# Patient Record
Sex: MALE | Race: WHITE | NOT HISPANIC OR LATINO | URBAN - METROPOLITAN AREA
[De-identification: names, ages, dates, MRNs, and addresses within clinical notes are randomized per-mention and may not be internally consistent; named-entity substitution may affect disease eponyms.]

---

## 2023-07-11 PROBLEM — Z00.00 ENCOUNTER FOR PREVENTIVE HEALTH EXAMINATION: Status: ACTIVE | Noted: 2023-07-11

## 2023-08-02 RX ORDER — EZETIMIBE 10 MG/1
10 TABLET ORAL DAILY
Qty: 90 | Refills: 3 | Status: ACTIVE | COMMUNITY
Start: 2023-08-02 | End: 1900-01-01

## 2023-12-19 ENCOUNTER — EMERGENCY (EMERGENCY)
Facility: HOSPITAL | Age: 53
LOS: 1 days | Discharge: HOME | End: 2023-12-19
Attending: EMERGENCY MEDICINE | Admitting: EMERGENCY MEDICINE
Payer: COMMERCIAL

## 2023-12-19 VITALS
TEMPERATURE: 98 F | RESPIRATION RATE: 16 BRPM | HEIGHT: 73 IN | WEIGHT: 191.8 LBS | OXYGEN SATURATION: 97 % | DIASTOLIC BLOOD PRESSURE: 91 MMHG | SYSTOLIC BLOOD PRESSURE: 175 MMHG | HEART RATE: 72 BPM

## 2023-12-19 VITALS
RESPIRATION RATE: 17 BRPM | OXYGEN SATURATION: 96 % | HEART RATE: 74 BPM | SYSTOLIC BLOOD PRESSURE: 133 MMHG | DIASTOLIC BLOOD PRESSURE: 86 MMHG

## 2023-12-19 DIAGNOSIS — N17.9 ACUTE KIDNEY FAILURE, UNSPECIFIED: ICD-10-CM

## 2023-12-19 DIAGNOSIS — D72.829 ELEVATED WHITE BLOOD CELL COUNT, UNSPECIFIED: ICD-10-CM

## 2023-12-19 DIAGNOSIS — Z88.0 ALLERGY STATUS TO PENICILLIN: ICD-10-CM

## 2023-12-19 DIAGNOSIS — N13.2 HYDRONEPHROSIS WITH RENAL AND URETERAL CALCULOUS OBSTRUCTION: ICD-10-CM

## 2023-12-19 DIAGNOSIS — R10.31 RIGHT LOWER QUADRANT PAIN: ICD-10-CM

## 2023-12-19 DIAGNOSIS — I25.10 ATHEROSCLEROTIC HEART DISEASE OF NATIVE CORONARY ARTERY WITHOUT ANGINA PECTORIS: ICD-10-CM

## 2023-12-19 LAB
ALBUMIN SERPL ELPH-MCNC: 4.1 G/DL — SIGNIFICANT CHANGE UP (ref 3.3–5)
ALBUMIN SERPL ELPH-MCNC: 4.1 G/DL — SIGNIFICANT CHANGE UP (ref 3.3–5)
ALP SERPL-CCNC: 51 U/L — SIGNIFICANT CHANGE UP (ref 40–120)
ALP SERPL-CCNC: 51 U/L — SIGNIFICANT CHANGE UP (ref 40–120)
ALT FLD-CCNC: 39 U/L — SIGNIFICANT CHANGE UP (ref 10–45)
ALT FLD-CCNC: 39 U/L — SIGNIFICANT CHANGE UP (ref 10–45)
ANION GAP SERPL CALC-SCNC: 11 MMOL/L — SIGNIFICANT CHANGE UP (ref 5–17)
ANION GAP SERPL CALC-SCNC: 11 MMOL/L — SIGNIFICANT CHANGE UP (ref 5–17)
APPEARANCE UR: CLEAR — SIGNIFICANT CHANGE UP
APPEARANCE UR: CLEAR — SIGNIFICANT CHANGE UP
AST SERPL-CCNC: 28 U/L — SIGNIFICANT CHANGE UP (ref 10–40)
AST SERPL-CCNC: 28 U/L — SIGNIFICANT CHANGE UP (ref 10–40)
BASOPHILS # BLD AUTO: 0.02 K/UL — SIGNIFICANT CHANGE UP (ref 0–0.2)
BASOPHILS # BLD AUTO: 0.02 K/UL — SIGNIFICANT CHANGE UP (ref 0–0.2)
BASOPHILS NFR BLD AUTO: 0.2 % — SIGNIFICANT CHANGE UP (ref 0–2)
BASOPHILS NFR BLD AUTO: 0.2 % — SIGNIFICANT CHANGE UP (ref 0–2)
BILIRUB SERPL-MCNC: 0.8 MG/DL — SIGNIFICANT CHANGE UP (ref 0.2–1.2)
BILIRUB SERPL-MCNC: 0.8 MG/DL — SIGNIFICANT CHANGE UP (ref 0.2–1.2)
BILIRUB UR-MCNC: NEGATIVE — SIGNIFICANT CHANGE UP
BILIRUB UR-MCNC: NEGATIVE — SIGNIFICANT CHANGE UP
BLD GP AB SCN SERPL QL: NEGATIVE — SIGNIFICANT CHANGE UP
BLD GP AB SCN SERPL QL: NEGATIVE — SIGNIFICANT CHANGE UP
BUN SERPL-MCNC: 26 MG/DL — HIGH (ref 7–23)
BUN SERPL-MCNC: 26 MG/DL — HIGH (ref 7–23)
CALCIUM SERPL-MCNC: 9.4 MG/DL — SIGNIFICANT CHANGE UP (ref 8.4–10.5)
CALCIUM SERPL-MCNC: 9.4 MG/DL — SIGNIFICANT CHANGE UP (ref 8.4–10.5)
CHLORIDE SERPL-SCNC: 103 MMOL/L — SIGNIFICANT CHANGE UP (ref 96–108)
CHLORIDE SERPL-SCNC: 103 MMOL/L — SIGNIFICANT CHANGE UP (ref 96–108)
CO2 SERPL-SCNC: 24 MMOL/L — SIGNIFICANT CHANGE UP (ref 22–31)
CO2 SERPL-SCNC: 24 MMOL/L — SIGNIFICANT CHANGE UP (ref 22–31)
COLOR SPEC: YELLOW — SIGNIFICANT CHANGE UP
COLOR SPEC: YELLOW — SIGNIFICANT CHANGE UP
CREAT SERPL-MCNC: 1.29 MG/DL — SIGNIFICANT CHANGE UP (ref 0.5–1.3)
CREAT SERPL-MCNC: 1.29 MG/DL — SIGNIFICANT CHANGE UP (ref 0.5–1.3)
DIFF PNL FLD: NEGATIVE — SIGNIFICANT CHANGE UP
DIFF PNL FLD: NEGATIVE — SIGNIFICANT CHANGE UP
EGFR: 66 ML/MIN/1.73M2 — SIGNIFICANT CHANGE UP
EGFR: 66 ML/MIN/1.73M2 — SIGNIFICANT CHANGE UP
EOSINOPHIL # BLD AUTO: 0.04 K/UL — SIGNIFICANT CHANGE UP (ref 0–0.5)
EOSINOPHIL # BLD AUTO: 0.04 K/UL — SIGNIFICANT CHANGE UP (ref 0–0.5)
EOSINOPHIL NFR BLD AUTO: 0.3 % — SIGNIFICANT CHANGE UP (ref 0–6)
EOSINOPHIL NFR BLD AUTO: 0.3 % — SIGNIFICANT CHANGE UP (ref 0–6)
GLUCOSE SERPL-MCNC: 119 MG/DL — HIGH (ref 70–99)
GLUCOSE SERPL-MCNC: 119 MG/DL — HIGH (ref 70–99)
GLUCOSE UR QL: NEGATIVE MG/DL — SIGNIFICANT CHANGE UP
GLUCOSE UR QL: NEGATIVE MG/DL — SIGNIFICANT CHANGE UP
HCT VFR BLD CALC: 40.5 % — SIGNIFICANT CHANGE UP (ref 39–50)
HCT VFR BLD CALC: 40.5 % — SIGNIFICANT CHANGE UP (ref 39–50)
HGB BLD-MCNC: 14.2 G/DL — SIGNIFICANT CHANGE UP (ref 13–17)
HGB BLD-MCNC: 14.2 G/DL — SIGNIFICANT CHANGE UP (ref 13–17)
IMM GRANULOCYTES NFR BLD AUTO: 0.3 % — SIGNIFICANT CHANGE UP (ref 0–0.9)
IMM GRANULOCYTES NFR BLD AUTO: 0.3 % — SIGNIFICANT CHANGE UP (ref 0–0.9)
KETONES UR-MCNC: ABNORMAL MG/DL
KETONES UR-MCNC: ABNORMAL MG/DL
LACTATE SERPL-SCNC: 0.9 MMOL/L — SIGNIFICANT CHANGE UP (ref 0.5–2)
LACTATE SERPL-SCNC: 0.9 MMOL/L — SIGNIFICANT CHANGE UP (ref 0.5–2)
LEUKOCYTE ESTERASE UR-ACNC: NEGATIVE — SIGNIFICANT CHANGE UP
LEUKOCYTE ESTERASE UR-ACNC: NEGATIVE — SIGNIFICANT CHANGE UP
LIDOCAIN IGE QN: 24 U/L — SIGNIFICANT CHANGE UP (ref 7–60)
LIDOCAIN IGE QN: 24 U/L — SIGNIFICANT CHANGE UP (ref 7–60)
LYMPHOCYTES # BLD AUTO: 1.26 K/UL — SIGNIFICANT CHANGE UP (ref 1–3.3)
LYMPHOCYTES # BLD AUTO: 1.26 K/UL — SIGNIFICANT CHANGE UP (ref 1–3.3)
LYMPHOCYTES # BLD AUTO: 10.3 % — LOW (ref 13–44)
LYMPHOCYTES # BLD AUTO: 10.3 % — LOW (ref 13–44)
MCHC RBC-ENTMCNC: 30.2 PG — SIGNIFICANT CHANGE UP (ref 27–34)
MCHC RBC-ENTMCNC: 30.2 PG — SIGNIFICANT CHANGE UP (ref 27–34)
MCHC RBC-ENTMCNC: 35.1 GM/DL — SIGNIFICANT CHANGE UP (ref 32–36)
MCHC RBC-ENTMCNC: 35.1 GM/DL — SIGNIFICANT CHANGE UP (ref 32–36)
MCV RBC AUTO: 86.2 FL — SIGNIFICANT CHANGE UP (ref 80–100)
MCV RBC AUTO: 86.2 FL — SIGNIFICANT CHANGE UP (ref 80–100)
MONOCYTES # BLD AUTO: 0.88 K/UL — SIGNIFICANT CHANGE UP (ref 0–0.9)
MONOCYTES # BLD AUTO: 0.88 K/UL — SIGNIFICANT CHANGE UP (ref 0–0.9)
MONOCYTES NFR BLD AUTO: 7.2 % — SIGNIFICANT CHANGE UP (ref 2–14)
MONOCYTES NFR BLD AUTO: 7.2 % — SIGNIFICANT CHANGE UP (ref 2–14)
NEUTROPHILS # BLD AUTO: 10.03 K/UL — HIGH (ref 1.8–7.4)
NEUTROPHILS # BLD AUTO: 10.03 K/UL — HIGH (ref 1.8–7.4)
NEUTROPHILS NFR BLD AUTO: 81.7 % — HIGH (ref 43–77)
NEUTROPHILS NFR BLD AUTO: 81.7 % — HIGH (ref 43–77)
NITRITE UR-MCNC: NEGATIVE — SIGNIFICANT CHANGE UP
NITRITE UR-MCNC: NEGATIVE — SIGNIFICANT CHANGE UP
NRBC # BLD: 0 /100 WBCS — SIGNIFICANT CHANGE UP (ref 0–0)
NRBC # BLD: 0 /100 WBCS — SIGNIFICANT CHANGE UP (ref 0–0)
PH UR: 6 — SIGNIFICANT CHANGE UP (ref 5–8)
PH UR: 6 — SIGNIFICANT CHANGE UP (ref 5–8)
PLATELET # BLD AUTO: 208 K/UL — SIGNIFICANT CHANGE UP (ref 150–400)
PLATELET # BLD AUTO: 208 K/UL — SIGNIFICANT CHANGE UP (ref 150–400)
POTASSIUM SERPL-MCNC: 4.5 MMOL/L — SIGNIFICANT CHANGE UP (ref 3.5–5.3)
POTASSIUM SERPL-MCNC: 4.5 MMOL/L — SIGNIFICANT CHANGE UP (ref 3.5–5.3)
POTASSIUM SERPL-SCNC: 4.5 MMOL/L — SIGNIFICANT CHANGE UP (ref 3.5–5.3)
POTASSIUM SERPL-SCNC: 4.5 MMOL/L — SIGNIFICANT CHANGE UP (ref 3.5–5.3)
PROT SERPL-MCNC: 6.6 G/DL — SIGNIFICANT CHANGE UP (ref 6–8.3)
PROT SERPL-MCNC: 6.6 G/DL — SIGNIFICANT CHANGE UP (ref 6–8.3)
PROT UR-MCNC: SIGNIFICANT CHANGE UP MG/DL
PROT UR-MCNC: SIGNIFICANT CHANGE UP MG/DL
RBC # BLD: 4.7 M/UL — SIGNIFICANT CHANGE UP (ref 4.2–5.8)
RBC # BLD: 4.7 M/UL — SIGNIFICANT CHANGE UP (ref 4.2–5.8)
RBC # FLD: 11.9 % — SIGNIFICANT CHANGE UP (ref 10.3–14.5)
RBC # FLD: 11.9 % — SIGNIFICANT CHANGE UP (ref 10.3–14.5)
RH IG SCN BLD-IMP: POSITIVE — SIGNIFICANT CHANGE UP
RH IG SCN BLD-IMP: POSITIVE — SIGNIFICANT CHANGE UP
SODIUM SERPL-SCNC: 138 MMOL/L — SIGNIFICANT CHANGE UP (ref 135–145)
SODIUM SERPL-SCNC: 138 MMOL/L — SIGNIFICANT CHANGE UP (ref 135–145)
SP GR SPEC: >1.03 — HIGH (ref 1–1.03)
SP GR SPEC: >1.03 — HIGH (ref 1–1.03)
UROBILINOGEN FLD QL: 0.2 MG/DL — SIGNIFICANT CHANGE UP (ref 0.2–1)
UROBILINOGEN FLD QL: 0.2 MG/DL — SIGNIFICANT CHANGE UP (ref 0.2–1)
WBC # BLD: 12.27 K/UL — HIGH (ref 3.8–10.5)
WBC # BLD: 12.27 K/UL — HIGH (ref 3.8–10.5)
WBC # FLD AUTO: 12.27 K/UL — HIGH (ref 3.8–10.5)
WBC # FLD AUTO: 12.27 K/UL — HIGH (ref 3.8–10.5)

## 2023-12-19 PROCEDURE — 81003 URINALYSIS AUTO W/O SCOPE: CPT

## 2023-12-19 PROCEDURE — 99285 EMERGENCY DEPT VISIT HI MDM: CPT | Mod: 25

## 2023-12-19 PROCEDURE — 85025 COMPLETE CBC W/AUTO DIFF WBC: CPT

## 2023-12-19 PROCEDURE — 86901 BLOOD TYPING SEROLOGIC RH(D): CPT

## 2023-12-19 PROCEDURE — 96375 TX/PRO/DX INJ NEW DRUG ADDON: CPT

## 2023-12-19 PROCEDURE — 74177 CT ABD & PELVIS W/CONTRAST: CPT | Mod: 26,MA

## 2023-12-19 PROCEDURE — 96376 TX/PRO/DX INJ SAME DRUG ADON: CPT

## 2023-12-19 PROCEDURE — 83605 ASSAY OF LACTIC ACID: CPT

## 2023-12-19 PROCEDURE — 36415 COLL VENOUS BLD VENIPUNCTURE: CPT

## 2023-12-19 PROCEDURE — 80053 COMPREHEN METABOLIC PANEL: CPT

## 2023-12-19 PROCEDURE — 74177 CT ABD & PELVIS W/CONTRAST: CPT | Mod: MA

## 2023-12-19 PROCEDURE — 86900 BLOOD TYPING SEROLOGIC ABO: CPT

## 2023-12-19 PROCEDURE — 99285 EMERGENCY DEPT VISIT HI MDM: CPT

## 2023-12-19 PROCEDURE — 96374 THER/PROPH/DIAG INJ IV PUSH: CPT | Mod: XU

## 2023-12-19 PROCEDURE — 86850 RBC ANTIBODY SCREEN: CPT

## 2023-12-19 PROCEDURE — 84484 ASSAY OF TROPONIN QUANT: CPT

## 2023-12-19 PROCEDURE — 93010 ELECTROCARDIOGRAM REPORT: CPT

## 2023-12-19 PROCEDURE — 93005 ELECTROCARDIOGRAM TRACING: CPT

## 2023-12-19 PROCEDURE — 83690 ASSAY OF LIPASE: CPT

## 2023-12-19 RX ORDER — FAMOTIDINE 10 MG/ML
20 INJECTION INTRAVENOUS ONCE
Refills: 0 | Status: COMPLETED | OUTPATIENT
Start: 2023-12-19 | End: 2023-12-19

## 2023-12-19 RX ORDER — SODIUM CHLORIDE 9 MG/ML
1000 INJECTION INTRAMUSCULAR; INTRAVENOUS; SUBCUTANEOUS ONCE
Refills: 0 | Status: COMPLETED | OUTPATIENT
Start: 2023-12-19 | End: 2023-12-19

## 2023-12-19 RX ORDER — KETOROLAC TROMETHAMINE 30 MG/ML
1 SYRINGE (ML) INJECTION
Qty: 12 | Refills: 0
Start: 2023-12-19 | End: 2023-12-21

## 2023-12-19 RX ORDER — MORPHINE SULFATE 50 MG/1
2 CAPSULE, EXTENDED RELEASE ORAL ONCE
Refills: 0 | Status: DISCONTINUED | OUTPATIENT
Start: 2023-12-19 | End: 2023-12-19

## 2023-12-19 RX ORDER — ONDANSETRON 8 MG/1
4 TABLET, FILM COATED ORAL ONCE
Refills: 0 | Status: COMPLETED | OUTPATIENT
Start: 2023-12-19 | End: 2023-12-19

## 2023-12-19 RX ORDER — OXYCODONE AND ACETAMINOPHEN 5; 325 MG/1; MG/1
1 TABLET ORAL
Qty: 10 | Refills: 0
Start: 2023-12-19 | End: 2023-12-21

## 2023-12-19 RX ORDER — DOCUSATE SODIUM 100 MG
1 CAPSULE ORAL
Qty: 14 | Refills: 0 | DISCHARGE
Start: 2023-12-19 | End: 2023-12-25

## 2023-12-19 RX ORDER — TAMSULOSIN HYDROCHLORIDE 0.4 MG/1
1 CAPSULE ORAL
Qty: 7 | Refills: 0
Start: 2023-12-19 | End: 2023-12-25

## 2023-12-19 RX ORDER — OXYCODONE AND ACETAMINOPHEN 5; 325 MG/1; MG/1
1 TABLET ORAL
Qty: 10 | Refills: 0 | DISCHARGE
Start: 2023-12-19 | End: 2023-12-21

## 2023-12-19 RX ORDER — DOCUSATE SODIUM 100 MG
1 CAPSULE ORAL
Qty: 14 | Refills: 0
Start: 2023-12-19 | End: 2023-12-25

## 2023-12-19 RX ORDER — KETOROLAC TROMETHAMINE 30 MG/ML
15 SYRINGE (ML) INJECTION ONCE
Refills: 0 | Status: DISCONTINUED | OUTPATIENT
Start: 2023-12-19 | End: 2023-12-19

## 2023-12-19 RX ORDER — ONDANSETRON 8 MG/1
1 TABLET, FILM COATED ORAL
Qty: 1 | Refills: 0
Start: 2023-12-19 | End: 2023-12-21

## 2023-12-19 RX ORDER — TAMSULOSIN HYDROCHLORIDE 0.4 MG/1
0.4 CAPSULE ORAL ONCE
Refills: 0 | Status: COMPLETED | OUTPATIENT
Start: 2023-12-19 | End: 2023-12-19

## 2023-12-19 RX ORDER — KETOROLAC TROMETHAMINE 30 MG/ML
1 SYRINGE (ML) INJECTION
Qty: 12 | Refills: 0 | DISCHARGE
Start: 2023-12-19 | End: 2023-12-21

## 2023-12-19 RX ORDER — CEFTRIAXONE 500 MG/1
1000 INJECTION, POWDER, FOR SOLUTION INTRAMUSCULAR; INTRAVENOUS ONCE
Refills: 0 | Status: COMPLETED | OUTPATIENT
Start: 2023-12-19 | End: 2023-12-19

## 2023-12-19 RX ADMIN — SODIUM CHLORIDE 1000 MILLILITER(S): 9 INJECTION INTRAMUSCULAR; INTRAVENOUS; SUBCUTANEOUS at 05:11

## 2023-12-19 RX ADMIN — TAMSULOSIN HYDROCHLORIDE 0.4 MILLIGRAM(S): 0.4 CAPSULE ORAL at 07:47

## 2023-12-19 RX ADMIN — FAMOTIDINE 20 MILLIGRAM(S): 10 INJECTION INTRAVENOUS at 02:26

## 2023-12-19 RX ADMIN — MORPHINE SULFATE 2 MILLIGRAM(S): 50 CAPSULE, EXTENDED RELEASE ORAL at 03:07

## 2023-12-19 RX ADMIN — Medication 15 MILLIGRAM(S): at 05:11

## 2023-12-19 RX ADMIN — MORPHINE SULFATE 2 MILLIGRAM(S): 50 CAPSULE, EXTENDED RELEASE ORAL at 04:49

## 2023-12-19 RX ADMIN — SODIUM CHLORIDE 1000 MILLILITER(S): 9 INJECTION INTRAMUSCULAR; INTRAVENOUS; SUBCUTANEOUS at 02:43

## 2023-12-19 RX ADMIN — ONDANSETRON 4 MILLIGRAM(S): 8 TABLET, FILM COATED ORAL at 03:09

## 2023-12-19 RX ADMIN — CEFTRIAXONE 100 MILLIGRAM(S): 500 INJECTION, POWDER, FOR SOLUTION INTRAMUSCULAR; INTRAVENOUS at 05:11

## 2023-12-19 NOTE — CONSULT NOTE ADULT - ASSESSMENT
53-year-old male past medical history of CAD no prior abdominal surgery here with right lower quadrant abdominal discomfort burning and associated nausea just prior to arrival.  No fever chills cough vomiting.  No associated chest pain shortness of breath or lightheadedness.  States that he has waves of pain without associated dysuria or flank pain.  Pain fluctuates between 5-8 out of 10.  Took Motrin prior to arrival had mild relief for 1 hour.  No difficulty urinating or hematuria.    Pt states pain is 5/10 s/p IV toradol and IV morphine. comes  and goes.  Pt is afebrile, hemodynamically stable in ED. Labs show wbc 12.27, Cr 1.29 UA neg nit/neg leukocyte esterase.    Plan:  -ok to discharge home  -flomax  -strain urine  -pain meds per ED provider  -stool softeners   -f/u  clinic    53-year-old male past medical history of CAD no prior abdominal surgery here with right lower quadrant abdominal discomfort burning and associated nausea just prior to arrival.  No fever chills cough vomiting.  No associated chest pain shortness of breath or lightheadedness.  States that he has waves of pain without associated dysuria or flank pain.  Pain fluctuates between 5-8 out of 10.  Took Motrin prior to arrival had mild relief for 1 hour.  No difficulty urinating or hematuria.    Pt states pain has significantly improved s/p IV toradol and IV morphine  Pt is afebrile, hemodynamically stable in ED. Labs show wbc 12.27, Cr 1.29 UA neg nit/neg leukocyte esterase.    Plan:  -ok to discharge home from urologic perspective  -flomax  -strain urine  -pain meds per ED provider  -stool softeners   -ED return precautions (e.g., fevers, chills, severe nausea/vomiting, severe abdominal/flank pain) provided  -Please have patient follow up with the Urology Clinic within 1-2 weeks of discharge. The office is located at [address] [place] [address], [place] [address]. The office phone number is [phone]. Will coordinate follow up upon discharge.  -Discussed with attending        53-year-old male past medical history of CAD no prior abdominal surgery here with right lower quadrant abdominal discomfort burning and associated nausea just prior to arrival.  No fever chills cough vomiting.  No associated chest pain shortness of breath or lightheadedness.  States that he has waves of pain without associated dysuria or flank pain.  Pain fluctuates between 5-8 out of 10.  Took Motrin prior to arrival had mild relief for 1 hour.  No difficulty urinating or hematuria.    Pt states pain has significantly improved s/p IV toradol and IV morphine  Pt is afebrile, hemodynamically stable in ED. Labs show wbc 12.27, Cr 1.29 UA neg nit/neg leukocyte esterase.    Plan:  -ok to discharge home from urologic perspective  -flomax  -strain urine  -pain meds per ED provider  -stool softeners   -ED return precautions (e.g., fevers, chills, severe nausea/vomiting, severe abdominal/flank pain) provided  -Please have patient follow up with the Urology Clinic within 1-2 weeks of discharge. The office is located at [address] [place] [address], [place] [address]. The office phone number is [phone]. Will coordinate follow up upon discharge.  -Discussed with attending

## 2023-12-19 NOTE — ED PROVIDER NOTE - NSFOLLOWUPINSTRUCTIONS_ED_ALL_ED_FT
Take one tab of toradol up to four times daily for pain. Take one tab of percocet up to three times daily for SEVERE pain. Percocet may make you drowsy and you should not drive while taking it.    Take one tab of zofran up to 30 minutes before eating, drinking or taking other medications for nausea.    Take one tab of flomax daily to assist with passage of stone.    Drink plenty of clear fluids. Your urine should be pale yellow if well hydrated.    Please schedule follow up in our [facility] on Friday.  [address] [place] [address] [address]  [phone]    Return to the Emergency Department if you develop fever>100.4F, pain uncontrolled by medications, vomiting, inability to urinate or any other concerns. Take one tab of toradol up to four times daily for pain. Take one tab of percocet up to three times daily for SEVERE pain. Percocet may make you drowsy and you should not drive while taking it.    Take one tab of zofran up to 30 minutes before eating, drinking or taking other medications for nausea.    Take one tab of flomax daily to assist with passage of stone.    Drink plenty of clear fluids. Your urine should be pale yellow if well hydrated.    Please schedule follow up in our [facility] Clinic on Friday.  [address] [place] [address] [address]  [phone]    Return to the Emergency Department if you develop fever>100.4F, pain uncontrolled by medications, vomiting, inability to urinate or any other concerns. Take one tab of toradol up to four times daily for pain. Take one tab of percocet up to three times daily for SEVERE pain. Percocet may make you drowsy and you should not drive while taking it.    Take one tab of zofran up to 30 minutes before eating, drinking or taking other medications for nausea.    Take one tab of flomax daily to assist with passage of stone.    Take one tab of colace twice daily to soften stool. Opioid pain medication can cause constipation.    Drink plenty of clear fluids. Your urine should be pale yellow if well hydrated.    Please schedule follow up in our [facility] on Friday.  [address] [place] [address] [address]  [phone]    Return to the Emergency Department if you develop fever>100.4F, pain uncontrolled by medications, vomiting, inability to urinate or any other concerns. Take one tab of toradol up to four times daily for pain. Take one tab of percocet up to three times daily for SEVERE pain. Percocet may make you drowsy and you should not drive while taking it.    Take one tab of zofran up to 30 minutes before eating, drinking or taking other medications for nausea.    Take one tab of flomax daily to assist with passage of stone.    Take one tab of colace twice daily to soften stool. Opioid pain medication can cause constipation.    Drink plenty of clear fluids. Your urine should be pale yellow if well hydrated.    Please schedule follow up in our [facility] Clinic on Friday.  [address] [place] [address] [address]  [phone]    Return to the Emergency Department if you develop fever>100.4F, pain uncontrolled by medications, vomiting, inability to urinate or any other concerns.

## 2023-12-19 NOTE — ED ADULT NURSE NOTE - OBJECTIVE STATEMENT
pt c/o right lower quadrant pain onset this evening. pt feeling nauseous but not v at this time. has pmh of double stents s/p plaque rupture. pt axo3, non diaphoretic. denies dysuria

## 2023-12-19 NOTE — CONSULT NOTE ADULT - NSCONSULTADDITIONALINFOA_GEN_ALL_CORE
Attg addendum: Pt prefers to go home. His pain was well controlled. We set up him up for f/u this friday at [facility] at 2:30pm. Attg addendum: Pt prefers to go home. His pain was well controlled. We set up him up for f/u this friday at [facility] at 2:30pm.

## 2023-12-19 NOTE — ED PROVIDER NOTE - PATIENT PORTAL LINK FT
You can access the FollowMyHealth Patient Portal offered by [facility] by registering at the following website: [email or web address]. By joining [employer]’s FollowMyHealth portal, you will also be able to view your health information using other applications (apps) compatible with our system. You can access the FollowMyHealth Patient Portal offered by [facility] by registering at the following website: [email or web address]. By joining [employer]’s FollowMyHealth portal, you will also be able to view your health information using other applications (apps) compatible with our system.

## 2023-12-19 NOTE — ED PROVIDER NOTE - OBJECTIVE STATEMENT
53-year-old male past medical history of CAD no prior abdominal surgery here with right lower quadrant abdominal discomfort burning and associated nausea just prior to arrival.  No fever chills cough vomiting.  No associated chest pain shortness of breath or lightheadedness.  States that he has waves of pain without associated dysuria or flank pain.  Pain fluctuates between 5-8 out of 10.  Took Motrin prior to arrival had mild relief for 1 hour.  No difficulty urinating or hematuria

## 2023-12-19 NOTE — ED ADULT TRIAGE NOTE - CHIEF COMPLAINT QUOTE
Pt presents to ER c/o sudden onset of RUQ waxing/waning abdominal "6/10 tightness" with associated nausea since ~1900. Pt reports taking advil with mild relief at ~2130. Pt denies any other associated symptoms at this time.

## 2023-12-19 NOTE — CONSULT NOTE ADULT - SUBJECTIVE AND OBJECTIVE BOX
53-year-old male past medical history of CAD no prior abdominal surgery here with right lower quadrant abdominal discomfort burning and associated nausea just prior to arrival.  No fever chills cough vomiting.  No associated chest pain shortness of breath or lightheadedness.  States that he has waves of pain without associated dysuria or flank pain.  Pain fluctuates between 5-8 out of 10.  Took Motrin prior to arrival had mild relief for 1 hour.  No difficulty urinating or hematuria.    Pt states pain is 5/10 s/p IV toradol and IV morphine. comes comes and goes.      Vital Signs Last 24 Hrs  T(C): 36.4 (19 Dec 2023 01:10), Max: 36.4 (19 Dec 2023 01:10)  T(F): 97.6 (19 Dec 2023 01:10), Max: 97.6 (19 Dec 2023 01:10)  HR: 74 (19 Dec 2023 04:48) (72 - 74)  BP: 150/88 (19 Dec 2023 04:48) (150/88 - 175/91)  BP(mean): --  RR: 17 (19 Dec 2023 04:48) (16 - 17)  SpO2: 96% (19 Dec 2023 04:48) (96% - 97%)    Parameters below as of 19 Dec 2023 04:48  Patient On (Oxygen Delivery Method): room air      I&O's Summary      PE:  Gen: awake and alert  Abd: nondistended, nontender  : no suprapubic/CVAT      LABS:                        14.2   12.27 )-----------( 208      ( 19 Dec 2023 02:12 )             40.5     12-19    138  |  103  |  26<H>  ----------------------------<  119<H>  4.5   |  24  |  1.29    Ca    9.4      19 Dec 2023 02:12    TPro  6.6  /  Alb  4.1  /  TBili  0.8  /  DBili  x   /  AST  28  /  ALT  39  /  AlkPhos  51  12-19      Cultures      < from: CT Abdomen and Pelvis w/ IV Cont (12.19.23 @ 03:31) >    PROCEDURE:  CT of the Abdomen and Pelvis was performed.  Sagittal and coronal reformats were performed.    FINDINGS:  LOWER CHEST: Within normal limits.    LIVER: Two subcentimeter hypodense lesion in the right hepatic lobe, too   small to characterize.  BILE DUCTS: Normal caliber.  GALLBLADDER: Contracted.  SPLEEN: Splenomegaly.  PANCREAS: Withinnormal limits.  ADRENALS: Within normal limits.  KIDNEYS/URETERS: Obstructing right 5 mm proximal ureteral stone ().   There is associated moderate right hydroureteronephrosis with marked   perinephric and periureteral fat stranding. Subcentimeter left lower pole   nonobstructing renal stone. No left-sided hydronephrosis. Bilateral   parapelvic cysts. Bilateral subcentimeter hypodense lesions, too small to   characterize.    BLADDER: Within normal limits.  REPRODUCTIVE ORGANS: Prostate within normal limits.    BOWEL: Colonic diverticulosis. No bowel obstruction. Appendix is normal.  PERITONEUM: No ascites.  VESSELS: Atherosclerotic changes.  RETROPERITONEUM/LYMPH NODES: No lymphadenopathy.  ABDOMINAL WALL: Within normal limits.  BONES: Levoscoliosis. Degenerative changes.    IMPRESSION:  1.  Obstructing right 5 mm proximal ureteral stone with moderate right   hydroureteronephrosis.  2.  Additional nonobstructing subcentimeter left lower pole renal stone.  3.  Normal appendix.        < end of copied text >    53-year-old male past medical history of CAD no prior abdominal surgery here with right lower quadrant abdominal discomfort burning and associated nausea just prior to arrival.  No fever chills cough vomiting.  No associated chest pain shortness of breath or lightheadedness.  States that he has waves of pain without associated dysuria or flank pain.  Pain fluctuates between 5-8 out of 10.  Took Motrin prior to arrival had mild relief for 1 hour.  No difficulty urinating or hematuria.    Pt states pain is 5/10 s/p IV toradol and IV morphine. comes  and goes.      Vital Signs Last 24 Hrs  T(C): 36.4 (19 Dec 2023 01:10), Max: 36.4 (19 Dec 2023 01:10)  T(F): 97.6 (19 Dec 2023 01:10), Max: 97.6 (19 Dec 2023 01:10)  HR: 74 (19 Dec 2023 04:48) (72 - 74)  BP: 150/88 (19 Dec 2023 04:48) (150/88 - 175/91)  BP(mean): --  RR: 17 (19 Dec 2023 04:48) (16 - 17)  SpO2: 96% (19 Dec 2023 04:48) (96% - 97%)    Parameters below as of 19 Dec 2023 04:48  Patient On (Oxygen Delivery Method): room air      I&O's Summary      PE:  Gen: awake and alert  Abd: nondistended, nontender  : no suprapubic/CVAT      LABS:                        14.2   12.27 )-----------( 208      ( 19 Dec 2023 02:12 )             40.5     12-19    138  |  103  |  26<H>  ----------------------------<  119<H>  4.5   |  24  |  1.29    Ca    9.4      19 Dec 2023 02:12    TPro  6.6  /  Alb  4.1  /  TBili  0.8  /  DBili  x   /  AST  28  /  ALT  39  /  AlkPhos  51  12-19      Cultures      < from: CT Abdomen and Pelvis w/ IV Cont (12.19.23 @ 03:31) >    PROCEDURE:  CT of the Abdomen and Pelvis was performed.  Sagittal and coronal reformats were performed.    FINDINGS:  LOWER CHEST: Within normal limits.    LIVER: Two subcentimeter hypodense lesion in the right hepatic lobe, too   small to characterize.  BILE DUCTS: Normal caliber.  GALLBLADDER: Contracted.  SPLEEN: Splenomegaly.  PANCREAS: Withinnormal limits.  ADRENALS: Within normal limits.  KIDNEYS/URETERS: Obstructing right 5 mm proximal ureteral stone ().   There is associated moderate right hydroureteronephrosis with marked   perinephric and periureteral fat stranding. Subcentimeter left lower pole   nonobstructing renal stone. No left-sided hydronephrosis. Bilateral   parapelvic cysts. Bilateral subcentimeter hypodense lesions, too small to   characterize.    BLADDER: Within normal limits.  REPRODUCTIVE ORGANS: Prostate within normal limits.    BOWEL: Colonic diverticulosis. No bowel obstruction. Appendix is normal.  PERITONEUM: No ascites.  VESSELS: Atherosclerotic changes.  RETROPERITONEUM/LYMPH NODES: No lymphadenopathy.  ABDOMINAL WALL: Within normal limits.  BONES: Levoscoliosis. Degenerative changes.    IMPRESSION:  1.  Obstructing right 5 mm proximal ureteral stone with moderate right   hydroureteronephrosis.  2.  Additional nonobstructing subcentimeter left lower pole renal stone.  3.  Normal appendix.        < end of copied text >

## 2023-12-19 NOTE — ED PROVIDER NOTE - PROGRESS NOTE DETAILS
patient requesting additional pain medication will order additional morphine Patient found to have obstructing 5 mm proximal ureteral stone elevated white blood cell count mild MALLY give additional IV fluids Toradol pain medication urology consultation Rocephin as patient does not have a true penicillin allergy states that he had a reaction when he was a child to penicillin does not remember what it is. Shawn: Urology evaluated pt, recommend dc with flomax, pain control, colace. Will have pt f/u on Friday. Pt's pain well controlled on reassessment. discharged with urine strainer and return precautions.

## 2023-12-19 NOTE — ED PROVIDER NOTE - CLINICAL SUMMARY MEDICAL DECISION MAKING FREE TEXT BOX
53-year-old male with right lower quadrant pain will evaluate for appendicitis less likely to be renal colic, colitis versus mesenteric adenitis, patient did have recent viral illness     plan for CBC CMP lipase CT  abdomen pelvis with IV contrast EKG cardiac enzyme pain control and reassessment UA   initially pain controlled with Toradol and morphine

## 2023-12-19 NOTE — ED ADULT NURSE NOTE - NSFALLUNIVINTERV_ED_ALL_ED
Bed/Stretcher in lowest position, wheels locked, appropriate side rails in place/Call bell, personal items and telephone in reach/Instruct patient to call for assistance before getting out of bed/chair/stretcher/Non-slip footwear applied when patient is off stretcher/[place] to call system/Physically safe environment - no spills, clutter or unnecessary equipment/Purposeful proactive rounding/Room/bathroom lighting operational, light cord in reach Bed/Stretcher in lowest position, wheels locked, appropriate side rails in place/Call bell, personal items and telephone in reach/Instruct patient to call for assistance before getting out of bed/chair/stretcher/Non-slip footwear applied when patient is off stretcher/[place] to call system/Physically safe environment - no spills, clutter or unnecessary equipment/Purposeful proactive rounding/Room/bathroom lighting operational, light cord in reach

## 2023-12-19 NOTE — ED PROVIDER NOTE - PHYSICAL EXAMINATION
VITAL SIGNS: I have reviewed nursing notes and confirm.  CONSTITUTIONAL: Well appearing, in no acute distress.   SKIN:  warm and dry, no acute rash.   HEAD:  normocephalic, atraumatic.  EYES: EOM intact; conjunctiva and sclera clear.  ENT: No nasal discharge; airway clear.   NECK: Supple.  CARD: S1, S2, Regular rate and rhythm.   RESP:  Clear to auscultation b/l, no wheezes, rales or rhonchi.  ABD: Right lower quadrant tenderness without rebound or guarding no CVA tenderness  EXT: Normal ROM. No peripheral edema. Pulses intact and equal b/l.  NEURO: Alert, oriented, grossly unremarkable  PSYCH: Cooperative, mood and affect appropriate.

## 2023-12-19 NOTE — ED PROVIDER NOTE - INTERNATIONAL TRAVEL
Iveth Madrigal MA   You 2 days ago                 Per 's January 30th office note. Pt is to be on 4 weeks of warfarin then D/C. So pt is no longer on warfarin.   Thank you (Routing comment)            No

## 2023-12-22 ENCOUNTER — APPOINTMENT (OUTPATIENT)
Dept: UROLOGY | Facility: CLINIC | Age: 53
End: 2023-12-22
Payer: COMMERCIAL

## 2023-12-22 ENCOUNTER — APPOINTMENT (OUTPATIENT)
Dept: UROLOGY | Facility: CLINIC | Age: 53
End: 2023-12-22

## 2023-12-22 ENCOUNTER — APPOINTMENT (OUTPATIENT)
Dept: HEART AND VASCULAR | Facility: CLINIC | Age: 53
End: 2023-12-22
Payer: COMMERCIAL

## 2023-12-22 VITALS
DIASTOLIC BLOOD PRESSURE: 99 MMHG | SYSTOLIC BLOOD PRESSURE: 176 MMHG | WEIGHT: 190 LBS | HEIGHT: 74 IN | TEMPERATURE: 98.3 F | BODY MASS INDEX: 24.38 KG/M2 | OXYGEN SATURATION: 98 % | HEART RATE: 88 BPM

## 2023-12-22 DIAGNOSIS — Z86.79 PERSONAL HISTORY OF OTHER DISEASES OF THE CIRCULATORY SYSTEM: ICD-10-CM

## 2023-12-22 DIAGNOSIS — E78.5 HYPERLIPIDEMIA, UNSPECIFIED: ICD-10-CM

## 2023-12-22 DIAGNOSIS — N20.0 CALCULUS OF KIDNEY: ICD-10-CM

## 2023-12-22 DIAGNOSIS — Z01.810 ENCOUNTER FOR PREPROCEDURAL CARDIOVASCULAR EXAMINATION: ICD-10-CM

## 2023-12-22 DIAGNOSIS — I25.10 ATHEROSCLEROTIC HEART DISEASE OF NATIVE CORONARY ARTERY W/OUT ANGINA PECTORIS: ICD-10-CM

## 2023-12-22 DIAGNOSIS — Z82.49 FAMILY HISTORY OF ISCHEMIC HEART DISEASE AND OTHER DISEASES OF THE CIRCULATORY SYSTEM: ICD-10-CM

## 2023-12-22 PROCEDURE — 99204 OFFICE O/P NEW MOD 45 MIN: CPT

## 2023-12-22 PROCEDURE — 99442: CPT

## 2023-12-22 RX ORDER — LEVOFLOXACIN 25 MG/ML
25 INJECTION, SOLUTION, CONCENTRATE INTRAVENOUS
Refills: 0 | Status: ACTIVE | COMMUNITY

## 2023-12-22 RX ORDER — LOSARTAN POTASSIUM 50 MG/1
50 TABLET, FILM COATED ORAL
Refills: 0 | Status: ACTIVE | COMMUNITY

## 2023-12-22 RX ORDER — KETOROLAC TROMETHAMINE 10 MG/1
10 TABLET, FILM COATED ORAL EVERY 6 HOURS
Qty: 16 | Refills: 0 | Status: ACTIVE | COMMUNITY
Start: 2023-12-22 | End: 1900-01-01

## 2023-12-22 RX ORDER — OXYCODONE 5 MG/1
5 TABLET ORAL EVERY 6 HOURS
Qty: 12 | Refills: 0 | Status: ACTIVE | COMMUNITY
Start: 2023-12-22 | End: 1900-01-01

## 2023-12-22 RX ORDER — OXYCODONE HYDROCHLORIDE AND ACETAMINOPHEN 10; 325 MG/1; MG/1
TABLET ORAL
Refills: 0 | Status: ACTIVE | COMMUNITY

## 2023-12-22 NOTE — HISTORY OF PRESENT ILLNESS
[FreeTextEntry1] : Name OCTAVIO ALCANTARA MRN [id number]  Sep  3 1970 ------------------------------------------------------------------------------------------------------------------------------------------- Date of First Visit: 23 Referring Provider/PCP: Dr. Emmanuel Viecnte / Dr. Josue Singh ------------------------------------------------------------------------------------------------------------------------------------------- CC: kidney stone   History of Present Illness: OCTAVIO ALCANTARA is a 54 y/o male PMH HTN, HLD, remote h/o angioplasty in his 30s who presents for evaluation of kidney stones. He was recently seen in the [facility] ED  for RLQ pain with radiation to right back/flank, noted to have 5 mm proximal right ureteral stone with mild hydronephrosis.  On personal review of his imaging, there is also a nonobstructing 3 mm left lower pole stone. WBC 12.3, Cr 1.29, UA unremarkable with the exception of trace ketones.   Imaging: CT scan from 23 can be found in [facility] PACS. Findings: Obstructing right 5 mm proximal ureteral stone (). There is associated moderate right hydroureteronephrosis with marked perinephric and periureteral fat stranding. Subcentimeter left lower pole nonobstructing renal stone. No left-sided hydronephrosis. Bilateral parapelvic cysts. Bilateral subcentimeter hypodense lesions, too small to characterize.   Previous urine cultures: none on record   Kidney Stone History: First-time stone former - yes Concurrent asymptomatic stone(s) - yes Comorbidities - non-contributory Family history of kidney stones - no

## 2023-12-22 NOTE — ASSESSMENT
[FreeTextEntry1] : Assessment:  OCTAVIO ALCANTARA is a 53 year old M with a 5 mm RIGHT proximal ureteral stone.  Also has a nonobstructing 3 mm stone in the left kidney.   I discussed the management of urolithiasis with the patient:   Watchful Waiting +/- Medical Expulsive Therapy (MET):  We can continue to watch the stone and will generally give up to 4 weeks for stone passage. The likelihood of passage goes down with increased stone size and more proximal location. However, I explained that there is always a risk that the stone could become more symptomatic (pain, infection) as it passes or not pass at all, which would require surgical treatment. MET is a conservative option wherein medications (most commonly, an alpha-blocker) and analgesia are prescribed to help expedite the passage of the ureteral stone. In general, it is reserved for distal stones between 6-10 mm, though contemporary scientific evidence is conflicting. The strength of the evidence for MET in small (<6 mm) stones in the distal ureter is weak, though it might still have a clinical benefit in larger ureteral stones (>5 mm). At the same time, risks associated with short-course alpha-blocker therapy are very low and likely outweighed by the potential upside of hastening stone passage.    I explained the technique of ureteroscopy in detail and how it is performed. Complete stone free rates (no residual fragments of any size) approach 90% for ureteral stones and likely range from 50-60% for renal stones. Very commonly, a ureteral stent is left in place at the conclusion of the procedure, but only if needed. I explained that if a stent is placed, it would need to be removed either cystoscopically under local anesthesia or it may have a string left externally through the urethra for removal in a few days after the procedure. Risks of ureteroscopy include, but are not limited to, bleeding, infection, injury to the bladder or ureter, ureteral perforation, ureteral stricture, residual fragments leading to subsequent symptoms or secondary procedures, and other risks involved with general anesthesia. There is also the risk that the procedure needs to be staged into more than one session based on the patient's internal anatomy and the size of the stone(s). Finally, dilation of the ureter and/or ureteral stent placement prior to definitive ureteroscopy may be necessary to achieve ureteral access safely in up to 5% of patients, particularly those who have not been previously instrumented.     Plan:  -Schedule for RIGHT ureteroscopy with laser lithotripsy 12/28 -Pre-operative labs completed  -UCx -Medical clearance requested

## 2023-12-24 PROBLEM — Z01.810 PRE-OPERATIVE CARDIOVASCULAR EXAMINATION: Status: ACTIVE | Noted: 2023-12-22

## 2023-12-24 PROBLEM — E78.5 HYPERLIPIDEMIA LDL GOAL <70: Status: ACTIVE | Noted: 2023-08-02

## 2023-12-24 PROBLEM — I25.10 CORONARY ARTERY DISEASE INVOLVING NATIVE CORONARY ARTERY OF NATIVE HEART WITHOUT ANGINA PECTORIS: Status: ACTIVE | Noted: 2023-09-10

## 2023-12-24 LAB — BACTERIA UR CULT: NORMAL

## 2023-12-26 ENCOUNTER — NON-APPOINTMENT (OUTPATIENT)
Age: 53
End: 2023-12-26

## 2023-12-26 ENCOUNTER — APPOINTMENT (OUTPATIENT)
Dept: HEART AND VASCULAR | Facility: CLINIC | Age: 53
End: 2023-12-26

## 2023-12-27 ENCOUNTER — TRANSCRIPTION ENCOUNTER (OUTPATIENT)
Age: 53
End: 2023-12-27

## 2023-12-27 NOTE — ASU PATIENT PROFILE, ADULT - ALCOHOL USE HISTORY SINGLE SELECT
[place] ambulatory encounter  [facility] PRACTICE PREOPERATIVE HISTORY AND PHYSICAL  FOR MEDICAL CLEARANCE    PRIMARY CARE PHYSICIAN:  Lian Garcias DO  REQUESTING PHYSICIAN:  Dr. Bryant     CHIEF COMPLAINT:  Pre-Op Exam (Hernia surgery 11/22/21, No concerns reported )    HISTORY OF PRESENT ILLNESS:  Jas Betts is a 78 year old male who presented for preoperative medical clearance.  The planned procedure is robotic assisted, laparoscopic left inguinal hernia repair and is scheduled on 11/22/21.  He has a hx of left inguinal hernia repair in 2017 and denies any complications at that time.     Recent health has been good.  Patient presented for pre operative testing on 11/10, at which time it was noted that he was in atrial fibrillation.  Pt. Has no hx of atrial fibrillation.  Pt. Was asymptomatic.  He subsequently underwent echocardiogram (findings as below).  Findings showed moderate enlargement of the right and left atria with an EF of 62%.  Pt. Denies chest pain, shortness of breath, edema, or palpitations.     Cardiac risk factors include: Hypertension, atrial fibrillation, not on anticoagulants.  No history of ischemic heart disease, prior congestive heart failure, no prior stroke or transient ischemic attack, no diabetes not on insulin, and no chronic kidney disease with a creatinine >2.0mg/dl.      Exercise tolerance includes the ability to walk four blocks on level surface or two flights of stairs without difficulty.  No history of chest pain on exertion.  No history of severe dyspnea or wheezing on exertion.  No history of malignant hyperthermia.  No history of methicillin resistant staph aureus.       ACTIVE PROBLEMS:  Patient Active Problem List   Diagnosis   • Adenoma of large intestine   • Elevated prostate specific antigen (PSA)   • Impotence of organic origin   • Benign essential hypertension   • Medicare annual wellness visit, subsequent   • Left inguinal hernia   • History of renal calculi   •  Tremor   • Other specified glaucoma   • Abnormal glucose   • BPH with obstruction/lower urinary tract symptoms   • Noise-induced hearing loss of right ear       PAST HISTORIES:  I have reviewed the past medical history, family history, social history, medications and allergies listed in the medical record as obtained by my nursing staff and support staff and agree with their documentation.  ALLERGIES:   Allergen Reactions   • Dye [Contrast Media] RASH     Iodinated       Current Outpatient Medications   Medication Sig Dispense Refill   • Rhopressa 0.02 % Solution Place 1 drop into both eyes daily. At bedtime     • lisinopril (ZESTRIL) 10 MG tablet Take 1 tablet by mouth every evening. 90 tablet 3   • amLODIPine (NORVASC) 5 MG tablet Take 1 tablet by mouth once daily 90 tablet 3   • Pyridoxine HCl (VITAMIN B-6) 100 MG tablet Take 100 mg by mouth daily.     • cyanocobalamin (VITAMIN B-12) 500 MCG tablet Take 500 mcg by mouth daily.     • bimatoprost (LUMIGAN) 0.01 % ophthalmic solution Place 1 drop into both eyes every evening.      • brimonidine-timolol 0.2-0.5 % ophthalmic solution Place 1 drop into both eyes 2 times daily.      • dorzolamide (TRUSOPT) 2 % ophthalmic solution Place 1 drop into right eye 3 times daily.      • Multiple Vitamins-Minerals (PRESERVISION AREDS 2 PO) Take 2 tablets by mouth daily.      • Magnesium 400 MG Cap Take 250 mg by mouth daily.      • aspirin 81 MG tablet Take 81 mg by mouth daily.     • Ergocalciferol 400 UNITS TABS Take 1 tablet by mouth daily.     • folic acid (FOLATE) 400 MCG tablet Take 400 mcg by mouth daily.     • Omega-3 Fatty Acids (OMEGA 3 PO) Take 1 capsule by mouth daily. 500mg       No current facility-administered medications for this visit.     Past Medical History:   Diagnosis Date   • Blood clot associated with vein wall inflammation     left lower extremity    • Erectile dysfunction    • Glaucoma    • Hemorrhoids    • Hypertension    • Kidney stone     S/P  lithotripsy    • Tremor     facial around eyes      Social History     Tobacco Use   • Smoking status: Former Smoker     Packs/day: 1.00     Years: 40.00     Pack years: 40.00     Types: Cigarettes     Quit date: 1/15/2011     Years since quitting: 10.8   • Smokeless tobacco: Never Used   Vaping Use   • Vaping Use: never used   Substance Use Topics   • Alcohol use: Yes     Comment: 3-5 beers daily,[address]io 64   • Drug use: No       SURGICAL/ANESTHESIA HISTORY:    []  YES    [x]  NO     []  UNKNOWN   History of problematic/difficult intubations.  []  YES    [x]  NO     []  UNKNOWN   History of prior anesthesia reactions.  []  YES    [x]  NO     []  UNKNOWN   Family history of anesthesia reactions.  []  YES    [x]  NO     []  UNKNOWN   History of bleeding or clotting disorders.  []  YES    [x]  NO     []  UNKNOWN   Family history of bleeding/clotting disorders.  []  YES    [x]  NO     []  UNKNOWN   Past history of blood transfusions.  []  YES    [x]  NO     []  UNKNOWN   History of exposure/treatment for hepatitis.  []  YES    [x]  NO     []  UNKNOWN   History of exposure to or treatment for TB.  []  YES    [x]  NO     []  UNKNOWN   History of AIDS related complex.    Information related to the above positive findings include: None.    ADVANCE DIRECTIVE:  on file    REVIEW OF SYSTEMS:  Constitutional:  Patient denies fever, chills, malaise, unintentional weight loss or gain.  Eyes:  Denies any change in visual acuity.  HEENT:  Denies sinus drainage/pain, ear pain, nasal congestion, nose bleeds, bleeding gums, or sore throat.  Respiratory:  Denies cough or shortness of breath.   Cardiovascular:  Denies chest pain, palpitations, dizziness, or decreased exercise tolerance over the last 6 months.    Gastrointestinal:  Denies abdominal pain, heartburn, nausea, vomiting, diarrhea.  Denies black, bloody or tarry stools.  Genitourinary:  Denies painful urination, urinary frequency, blood in urine.  Neurologic:  Denies headache,  facial numbness, tingling or focal weakness.  Musculoskeletal:  Denies back pain or neck pain.  Denies calf pain or leg swelling.  Lymphatics:  Denies painful or swollen glands.    PHYSICAL EXAM:  Vital Signs:    Visit Vitals  /78 (BP Location: RUE - Right upper extremity, Patient Position: Sitting, Cuff Size: Regular)   Pulse 64   Temp 98.6 °F (37 °C) (Temporal)   Ht 5' 9\" (1.753 m)   Wt 75.8 kg (167 lb)   SpO2 98%   BMI 24.66 kg/m²     Pulse Ox Interpretation:  Within normal limits.  General:   Alert, cooperative, conversive in no acute distress.  Skin:  Warm and dry without rash.    Head:  Normocephalic, atraumatic.   Neck:  Trachea is midline. No adenopathy.    Eyes:  Normal conjunctivae and sclerae.  Pupils equal, round and reactive to light.  Extraocular movements intact.  ENT:  Mucous membranes are moist.  Normal tympanic membranes and external auditory canals bilaterally.  No pharyngeal erythema or exudate.    Cardiovascular:  Symmetrical pulses.  Regular rate and rhythm without murmur.  Respiratory:  Normal respiratory effort.  Clear to auscultation.  No wheezes, rales or rhonchi.  Gastrointestinal:  Soft and non tender.  Normal bowel sounds.  No hepatomegaly or splenomegaly.   Musculoskeletal:  No deformity or edema.  No tenderness to palpation.  Back:   Normal alignment.  No costovertebral angle tenderness or midline bony tenderness.  Neurologic:   Oriented x4.   No focal deficits or lateralizing signs.  Psychiatric:   Cooperative.  Appropriate mood and affect.    ECG RESULTS:   9/28/21  Atrial Fibrillation   New when compared to EKG from 12/12/17    ECHOCARDIOGRAM 11/10/21:  EF 62%  Normal left ventricular systolic function.  No regional wall motion abnormalities.  Rhythm precludes evaluation of diastolic function.  Right ventricular systolic pressure 35.5 mmHg.  Moderately increased left atrial size.  Moderately increased right atrial size.  Mildtomoderate  mitral valve regurgitation.  Aortic  valve sclerosis without stenosis.  No pericardial effusion.  Normal size aortic root and proximal ascending aorta.  Normal IVC dimension with <50% respiratory change of the inferior vena cava.  No prior study available for comparison.      RADIOLOGY AND LAB RESULTS:  Lab Results   Component Value Date    WBC 7.3 11/10/2021    RBC 4.81 11/10/2021    HCT 48.4 11/10/2021    HGB 15.7 11/10/2021     11/10/2021    SODIUM 138 11/10/2021    POTASSIUM 4.4 11/10/2021    CO2 29 11/10/2021    CHLORIDE 102 11/10/2021    BUN 21 (H) 11/10/2021    CREATININE 0.96 11/10/2021    GLUCOSE 86 11/10/2021       ASSESSMENT:   This is a 78 year old male with a left inguinal hernia planning to undergo robotic assisted, laparoscopic left inguinal hernia repair.  Patient's primary risk factors are:  Hypertension and new atrial fibrillation, uncoagulated. During today's visit, patient's HR is regular with regular rate, giving the impression that the patient is no longer in atrial fibrillation.  Patient denies any anginal symptoms and reports good exertional tolerance for activities > 4 METs intensity.  Cardiology is aware of the patient's condition and a service to cardiology referral has been placed.  We will let cardiology determine if the patient requires evaluation prior to surgery, or postoperatively based on his new atrial fibrillation, which I do not believe him to be in today, as well as his atrial enlargement noted on recent echocardiogram.     Final clearance depends on review of all information by the operating physician and the anesthesiologist.          PLAN:  1. Surgery: Robotic assisted, laparoscopic left inguinal hernia repair on 11/22/21 by Dr. Bryant.  2. Perioperative management and restrictions under the direction of his surgeon and anesthesiologist.  3. Patient is to avoid NSAIDs, aspirin, herbals / supplements, and alcohol for the week leading up to surgery.    4.  He will hold lisinopril the night before surgery.   May continue other chronic medications as currently prescribed unless otherwise directed by the surgery department.   5. Pt. Will notify their PCP's office if any new or worsening symptoms develop between today and the date of surgery.  6. Follow-up with Dr. Bryant per postoperative instructions.      DIAGNOSIS:  1. Pre-op evaluation    2. Atrial fibrillation, unspecified type (CMS/HCC)        Felicia AAYUSH Bradley       yes...

## 2023-12-27 NOTE — ASU PATIENT PROFILE, ADULT - NSICDXPASTMEDICALHX_GEN_ALL_CORE_FT
PAST MEDICAL HISTORY:  CAD (coronary artery disease)     Diabetes     Elevated lipids     HTN (hypertension)      PAST MEDICAL HISTORY:  CAD (coronary artery disease)     Elevated lipids     HTN (hypertension)

## 2023-12-27 NOTE — ASU PATIENT PROFILE, ADULT - FALL HARM RISK - UNIVERSAL INTERVENTIONS
Bed in lowest position, wheels locked, appropriate side rails in place/Call bell, personal items and telephone in reach/Instruct patient to call for assistance before getting out of bed or chair/Non-slip footwear when patient is out of bed/[place] to call system/Physically safe environment - no spills, clutter or unnecessary equipment/Purposeful Proactive Rounding/Room/bathroom lighting operational, light cord in reach Bed in lowest position, wheels locked, appropriate side rails in place/Call bell, personal items and telephone in reach/Instruct patient to call for assistance before getting out of bed or chair/Non-slip footwear when patient is out of bed/[place] to call system/Physically safe environment - no spills, clutter or unnecessary equipment/Purposeful Proactive Rounding/Room/bathroom lighting operational, light cord in reach

## 2023-12-27 NOTE — ASU PATIENT PROFILE, ADULT - NSICDXPASTSURGICALHX_GEN_ALL_CORE_FT
PAST SURGICAL HISTORY:  History of tonsillectomy      PAST SURGICAL HISTORY:  History of tonsillectomy     Status post primary angioplasty

## 2023-12-28 ENCOUNTER — OUTPATIENT (OUTPATIENT)
Dept: INPATIENT UNIT | Facility: HOSPITAL | Age: 53
LOS: 1 days | Discharge: HOME | End: 2023-12-28
Payer: COMMERCIAL

## 2023-12-28 ENCOUNTER — TRANSCRIPTION ENCOUNTER (OUTPATIENT)
Age: 53
End: 2023-12-28

## 2023-12-28 ENCOUNTER — APPOINTMENT (OUTPATIENT)
Dept: UROLOGY | Facility: HOSPITAL | Age: 53
End: 2023-12-28

## 2023-12-28 ENCOUNTER — RESULT REVIEW (OUTPATIENT)
Age: 53
End: 2023-12-28

## 2023-12-28 VITALS
DIASTOLIC BLOOD PRESSURE: 97 MMHG | RESPIRATION RATE: 18 BRPM | OXYGEN SATURATION: 96 % | WEIGHT: 197.75 LBS | SYSTOLIC BLOOD PRESSURE: 142 MMHG | HEART RATE: 83 BPM | HEIGHT: 73 IN | TEMPERATURE: 98 F

## 2023-12-28 VITALS — SYSTOLIC BLOOD PRESSURE: 139 MMHG | HEART RATE: 78 BPM | DIASTOLIC BLOOD PRESSURE: 88 MMHG

## 2023-12-28 DIAGNOSIS — Z90.89 ACQUIRED ABSENCE OF OTHER ORGANS: Chronic | ICD-10-CM

## 2023-12-28 DIAGNOSIS — N20.1 CALCULUS OF URETER: ICD-10-CM

## 2023-12-28 DIAGNOSIS — Z98.890 OTHER SPECIFIED POSTPROCEDURAL STATES: Chronic | ICD-10-CM

## 2023-12-28 PROCEDURE — 87086 URINE CULTURE/COLONY COUNT: CPT

## 2023-12-28 PROCEDURE — 82365 CALCULUS SPECTROSCOPY: CPT

## 2023-12-28 PROCEDURE — 76000 FLUOROSCOPY <1 HR PHYS/QHP: CPT

## 2023-12-28 PROCEDURE — 52356 CYSTO/URETERO W/LITHOTRIPSY: CPT | Mod: RT

## 2023-12-28 PROCEDURE — 88300 SURGICAL PATH GROSS: CPT

## 2023-12-28 PROCEDURE — C2617: CPT

## 2023-12-28 PROCEDURE — 88300 SURGICAL PATH GROSS: CPT | Mod: 26

## 2023-12-28 PROCEDURE — 74420 UROGRAPHY RTRGR +-KUB: CPT | Mod: 26,RT

## 2023-12-28 PROCEDURE — C1758: CPT

## 2023-12-28 PROCEDURE — C1769: CPT

## 2023-12-28 PROCEDURE — C1889: CPT

## 2023-12-28 DEVICE — URETERAL CATH OPEN END 5FR 70CM: Type: IMPLANTABLE DEVICE | Status: FUNCTIONAL

## 2023-12-28 DEVICE — LASER FIBER SOLTIVE 365: Type: IMPLANTABLE DEVICE | Status: FUNCTIONAL

## 2023-12-28 DEVICE — STONE BASKET ZEROTIP NITINOL 4-WIRE 2.4FR 120CM X 12MM: Type: IMPLANTABLE DEVICE | Status: FUNCTIONAL

## 2023-12-28 DEVICE — URETERAL STENT CONTOUR 6FR 26CM: Type: IMPLANTABLE DEVICE | Status: FUNCTIONAL

## 2023-12-28 DEVICE — URETERAL CATH DUAL LUMEN 10FR 54CM: Type: IMPLANTABLE DEVICE | Status: FUNCTIONAL

## 2023-12-28 DEVICE — GUIDEWIRE SENSOR DUAL-FLEX NITINOL STRAIGHT .038" X 150CM: Type: IMPLANTABLE DEVICE | Status: FUNCTIONAL

## 2023-12-28 RX ORDER — LIDOCAINE HCL 20 MG/ML
5 VIAL (ML) INJECTION ONCE
Refills: 0 | Status: DISCONTINUED | OUTPATIENT
Start: 2023-12-28 | End: 2023-12-28

## 2023-12-28 RX ORDER — TAMSULOSIN HYDROCHLORIDE 0.4 MG/1
1 CAPSULE ORAL
Qty: 7 | Refills: 0
Start: 2023-12-28 | End: 2024-01-03

## 2023-12-28 RX ORDER — SODIUM CHLORIDE 9 MG/ML
1000 INJECTION INTRAMUSCULAR; INTRAVENOUS; SUBCUTANEOUS
Refills: 0 | Status: DISCONTINUED | OUTPATIENT
Start: 2023-12-28 | End: 2023-12-28

## 2023-12-28 RX ORDER — PHENAZOPYRIDINE HCL 100 MG
1 TABLET ORAL
Qty: 0 | Refills: 0 | DISCHARGE
Start: 2023-12-28

## 2023-12-28 RX ORDER — ASPIRIN/CALCIUM CARB/MAGNESIUM 324 MG
0 TABLET ORAL
Refills: 0 | DISCHARGE

## 2023-12-28 RX ORDER — LEVOFLOXACIN 5 MG/ML
1 INJECTION, SOLUTION INTRAVENOUS
Refills: 0 | DISCHARGE

## 2023-12-28 RX ORDER — METOPROLOL TARTRATE 50 MG
1 TABLET ORAL
Refills: 0 | DISCHARGE

## 2023-12-28 RX ORDER — PHENAZOPYRIDINE HCL 100 MG
1 TABLET ORAL
Qty: 9 | Refills: 0
Start: 2023-12-28 | End: 2023-12-30

## 2023-12-28 RX ORDER — ROSUVASTATIN CALCIUM 5 MG/1
1 TABLET ORAL
Refills: 0 | DISCHARGE

## 2023-12-28 RX ORDER — LOSARTAN POTASSIUM 100 MG/1
1 TABLET, FILM COATED ORAL
Refills: 0 | DISCHARGE

## 2023-12-28 RX ORDER — EZETIMIBE 10 MG/1
1 TABLET ORAL
Refills: 0 | DISCHARGE

## 2023-12-28 RX ORDER — PHENAZOPYRIDINE HCL 100 MG
100 TABLET ORAL ONCE
Refills: 0 | Status: DISCONTINUED | OUTPATIENT
Start: 2023-12-28 | End: 2023-12-28

## 2023-12-28 RX ORDER — OXYBUTYNIN CHLORIDE 5 MG
1 TABLET ORAL
Qty: 7 | Refills: 0
Start: 2023-12-28 | End: 2024-01-03

## 2023-12-28 RX ORDER — ACETAMINOPHEN 500 MG
650 TABLET ORAL ONCE
Refills: 0 | Status: DISCONTINUED | OUTPATIENT
Start: 2023-12-28 | End: 2023-12-28

## 2023-12-28 NOTE — BRIEF OPERATIVE NOTE - OPERATION/FINDINGS
R semi rigid ureteroscopy, retrograde pyelography, laser lithotripsy, stone basketing, ureteral stent insertion [id number]

## 2023-12-28 NOTE — ASU DISCHARGE PLAN (ADULT/PEDIATRIC) - NS MD DC FALL RISK RISK
For information on Fall & Injury Prevention, visit: [email or web address] OR  [email or web address] OR  https://www.cdc.gov/steadi/patient.html For information on Fall & Injury Prevention, visit: [email or web address] OR  [email or web address] OR  https://www.cdc.gov/steadi/patient.html

## 2023-12-28 NOTE — PRE-ANESTHESIA EVALUATION ADULT - NSANTHPMHFT_GEN_ALL_CORE
renal calculus, pain controlled, no N/V  CAD s/p PCI x2, no CP/SOB, good exercise tolerance  HTN last dose losartan 12/27, last dose metoprolol 12/28  currently being treated for sinus infection with Levaquin, still has pain in sinuses but no fever/productive cough/discolored sputum

## 2023-12-28 NOTE — BRIEF OPERATIVE NOTE - NSICDXBRIEFPOSTOP_GEN_ALL_CORE_FT
POST-OP DIAGNOSIS:  Right ureteral stone 28-Dec-2023 09:34:03  Santos Fields   Attending Attestation (For Attendings USE Only)...

## 2023-12-28 NOTE — ASU DISCHARGE PLAN (ADULT/PEDIATRIC) - ASU DC SPECIAL INSTRUCTIONSFT
URETEROSCOPY    GENERAL: It is common to have blood in your urine after your procedure. It may be pink or even red; and it is important to increase fluid intake to 2-3L of water per day to keep the urine as clear as possible. Please inform your doctor if you have a significant amount of clot in the urine or if you are unable to void at all. The urine may clear and then become bloody again especially as you are more physically active.    STENT: You may have an internal stent (a hollow tube that runs from the kidney to your bladder) after your procedure, which helps urine drain from the kidney to your bladder. Some patients experience urinary frequency, burning, or even back pain (especially with urination). These sensations will gradually get better. Increasing your fluid intake can also improve these symptoms. While the stent is in place, your urine may continue to be bloody. This stent is temporary and must be removed by your urologist as an outpatient with in 3 months unless otherwise specified. If your stent is on a string, it is secured to your leg or genitalia with an adhesive bandage. Do not pull on the string, do not remove the bandage, do not insert anything intravaginally/intraurethrally, and do not engage in sexual intercourse until after the stent is removed at your post-operative appointment.    UROLOGIC MEDICATIONS:  The following medications may have been sent to your pharmacy for stent related discomfort: Flomax (tamsulosin) 0.4mg at bedtime until stent removed, Ditropan (oxybutynin) 10mg every 24 hours as needed for bladder spasms, and Pyridium (phenazopyridine) 100mg every 8 hours as needed for kidney/bladder discomfort for max 3 days (Pyridium will make your urine orange).     PAIN: You may take Tylenol (acetaminophen) 650-975mg and/or Motrin (ibuprofen) 400-600mg, both available over the counter, for pain every 6 hours as needed. Do not exceed 4000mg of Tylenol (acetaminophen) daily. You may alternate these medications such that you take one or the other every 3 hours for around the clock pain coverage.     ANTIBIOTICS: none    STOOL SOFTENERS: Do not allow yourself to become constipated as straining may cause bleeding. Take stool softeners or a laxative (ex. Miralax, Colace, Senokot, ExLax, etc), available over the counter, if needed.    ANTICOAGULATION: If you are taking any blood thinning medications, please discuss with your urologist prior to restarting these medications unless otherwise specified.    BATHING: You may shower or bathe.    DIET: You may resume your regular diet and regular medication regimen.    ACTIVITY: No heavy lifting or strenuous exercise until you are evaluated at your post-operative appointment. Otherwise, you may return to your usual level of physical activity.    FOLLOW-UP: Dr Le office will call to schedule follow up for stent removal    CALL YOUR UROLOGIST IF: You have any bleeding that does not stop, inability to void >8 hours, fever over 100.4 F, chills, persistent nausea/vomiting, changes in your incision concerning for infection, or if your pain is not controlled on your discharge pain medications.

## 2023-12-29 PROBLEM — I10 ESSENTIAL (PRIMARY) HYPERTENSION: Chronic | Status: ACTIVE | Noted: 2023-12-27

## 2023-12-29 PROBLEM — I25.10 ATHEROSCLEROTIC HEART DISEASE OF NATIVE CORONARY ARTERY WITHOUT ANGINA PECTORIS: Chronic | Status: ACTIVE | Noted: 2023-12-27

## 2023-12-30 PROBLEM — E78.5 HYPERLIPIDEMIA, UNSPECIFIED: Chronic | Status: ACTIVE | Noted: 2023-12-27

## 2023-12-31 LAB
CULTURE RESULTS: NO GROWTH — SIGNIFICANT CHANGE UP
CULTURE RESULTS: NO GROWTH — SIGNIFICANT CHANGE UP
SPECIMEN SOURCE: SIGNIFICANT CHANGE UP
SPECIMEN SOURCE: SIGNIFICANT CHANGE UP

## 2024-01-05 ENCOUNTER — APPOINTMENT (OUTPATIENT)
Dept: UROLOGY | Facility: CLINIC | Age: 54
End: 2024-01-05
Payer: COMMERCIAL

## 2024-01-05 VITALS
WEIGHT: 195 LBS | OXYGEN SATURATION: 99 % | HEART RATE: 77 BPM | BODY MASS INDEX: 25.03 KG/M2 | HEIGHT: 74 IN | TEMPERATURE: 97.2 F

## 2024-01-05 DIAGNOSIS — N20.0 CALCULUS OF KIDNEY: ICD-10-CM

## 2024-01-05 LAB
CELL MATERIAL STONE EST-MCNT: SIGNIFICANT CHANGE UP
CELL MATERIAL STONE EST-MCNT: SIGNIFICANT CHANGE UP
LABORATORY COMMENT REPORT: SIGNIFICANT CHANGE UP
LABORATORY COMMENT REPORT: SIGNIFICANT CHANGE UP
NIDUS STONE QN: SIGNIFICANT CHANGE UP
NIDUS STONE QN: SIGNIFICANT CHANGE UP

## 2024-01-05 PROCEDURE — 52310 CYSTOSCOPY AND TREATMENT: CPT

## 2024-01-05 NOTE — HISTORY OF PRESENT ILLNESS
[FreeTextEntry1] : Name OCTAVIO ALCANTARA MRN [id number]  Sep 3 1970 ------------------------------------------------------------------------------------------------------------------------------------------- Date of First Visit: 23 Referring Provider/PCP: Dr. Emmnauel Vicente / Dr. Josue Singh ------------------------------------------------------------------------------------------------------------------------------------------- CC: kidney stone  History of Present Illness: OCTAVIO ALCANTARA is a 52 y/o male PMH HTN, HLD, remote h/o angioplasty in his 30s who presents for evaluation of kidney stones. He was recently seen in the [facility] ED  for RLQ pain with radiation to right back/flank, noted to have 5 mm proximal right ureteral stone with mild hydronephrosis. On personal review of his imaging, there is also a nonobstructing 3 mm left lower pole stone. WBC 12.3, Cr 1.29, UA unremarkable with the exception of trace ketones.  Imaging: CT scan from 23 can be found in [facility] PACS. Findings: Obstructing right 5 mm proximal ureteral stone (). There is associated moderate right hydroureteronephrosis with marked perinephric and periureteral fat stranding. Subcentimeter left lower pole nonobstructing renal stone. No left-sided hydronephrosis. Bilateral parapelvic cysts. Bilateral subcentimeter hypodense lesions, too small to characterize.  Previous urine cultures: none on record  Kidney Stone History: First-time stone former - yes Concurrent asymptomatic stone(s) - yes Comorbidities - non-contributory Family history of kidney stones - no -------------------------------------------------------------------------------------------------------------------------------------------  Interval History (2024): OCTAVIO QUINTON presents s/p right ureteroscopy with laser lithotripsy and stent placement on 23. Patient presents today for stent removal and postoperative follow-up.  He feels well. Denies fever, chills, nausea, vomiting. No stent-related symptoms. Had some GH the other day following excessive walking.   Procedure: Stent was removed cystoscopically using sterile technique. Stent was intact. Patient tolerated the procedure well.

## 2024-01-05 NOTE — ASSESSMENT
[FreeTextEntry1] : Assessment:   OCTAVIO ALCANTARA is a 52 y/o M who presents s/p right ureteroscopy with laser lithotripsy. Patient is a first-time stone former with a 3 mm non-obstructing stone in the left (contralateral) kidney.      -We reviewed common symptoms after stent removal and advised that they should resolve within the next couple of days. Patient knows to contact the office if they experience any fevers (temp >100.4) or any other concerns.      -We discussed generalized stone prevention strategies, metabolic evaluation (serum chemistry profile and 24-hour urine collection +/- PTH testing if serum Ca is elevated), and the natural history of kidney stone disease. I explained that first-time stone formers generally do not need a complete metabolic work-up in the absence of risk factors. However, without behavioral and dietary modification, they are at a heightened risk of developing future symptomatic stones: 10% at 2 years, 20% at 5 years, and 30% at 10 years ([facility] data). In recurrent stone formers, the risk of recurrence is considerably higher with a lifetime recurrence rate of 60-80%. Risk factors include stone type, total stone volume, family history, multiple stones, and many medical comorbidities (DM, HTN, HLD, obesity, gout, HPT).   Generalized stone prevention counseling was provided as follows:   1. High fluid intake. The goal should be to drink enough to produce 2.5 liters (quarts) of urine daily. Most studies have shown that high fluid volume intake will decrease the risk of stone formation. Research generally indicates that there appears to be little difference between hard and soft water in the formation of kidney stones. Lemon juice added to the water may also aid with increasing urine pH, urine citric acid and reducing stone formation.      2. Dietary recommendations. The key to all dietary recommendations is moderation.      Decrease animal protein consumption. High protein intake increases urinary calcium, oxalate, and uric acid excretion into the urine - all of which will increase the probability of stone formation.   Decrease sodium intake by avoiding heavily salted foods, and resist adding salt to food at the table. Sodium restriction is widely recognized as an important element of dietary prevention of recurrent kidney stones.    Dietary calcium.  Patient should consume a daily recommended allowance of dietary calcium (800-1200 mg). Patients who take in too much Ca or too little Ca are at an increased risk of recurrent stone formation. Dietary calcium is preferable to supplements. Calcium supplementation can be safe when the calcium is taken with meals, rather than at bedtime. Another suggestion for patients who have been recommended to take calcium supplements for their bone health is to consider using calcium citrate, an over-the-counter preparation that provides 950 mg of calcium citrate and 200 mg of elemental calcium in each tablet.   Avoid excess intake of foods with high oxalate content, including dark leafy greens, beets, nuts, tea, coffee, and chocolate. Strict avoidance of oxalate is not necessary in most cases.   Avoid high doses of vitamin C. Intake should be limited to a maximum daily dose of less than 2 gm (2,000 mg).    Plan:  -Follow up in 6 weeks with renal US in office (traveling beforehand) -Carina arteaga

## 2024-01-06 ENCOUNTER — TRANSCRIPTION ENCOUNTER (OUTPATIENT)
Age: 54
End: 2024-01-06

## 2024-01-09 LAB
SURGICAL PATHOLOGY STUDY: SIGNIFICANT CHANGE UP
SURGICAL PATHOLOGY STUDY: SIGNIFICANT CHANGE UP

## 2024-01-10 ENCOUNTER — TRANSCRIPTION ENCOUNTER (OUTPATIENT)
Age: 54
End: 2024-01-10

## 2024-01-10 ENCOUNTER — NON-APPOINTMENT (OUTPATIENT)
Age: 54
End: 2024-01-10

## 2024-01-26 ENCOUNTER — TRANSCRIPTION ENCOUNTER (OUTPATIENT)
Age: 54
End: 2024-01-26

## 2024-02-05 ENCOUNTER — TRANSCRIPTION ENCOUNTER (OUTPATIENT)
Age: 54
End: 2024-02-05

## 2024-02-23 ENCOUNTER — APPOINTMENT (OUTPATIENT)
Dept: UROLOGY | Facility: CLINIC | Age: 54
End: 2024-02-23
Payer: COMMERCIAL

## 2024-02-23 PROCEDURE — 76775 US EXAM ABDO BACK WALL LIM: CPT

## 2024-02-23 PROCEDURE — 99214 OFFICE O/P EST MOD 30 MIN: CPT

## 2024-02-23 NOTE — ASSESSMENT
[FreeTextEntry1] : Assessment:   OCTAVIO ALCANTARA is a 52 y/o M who presents s/p right URS/LL 12/28/2023. Right side stone-free and stable left lower pole stone on US.  24-hour urinalysis is notable for hyperoxaluria, hyperuricosuria, and mild hypercalciuria in the setting of elevated 24-hour urinary sodium.  Reviewed results of recent [facility] 24-hour urinalysis and recommendations that may reduce the risk of stone growth and new stone formation. -Increase fluid intake. The goal should be to drink enough to produce 2.5 to 3 liters of urine daily and urine should be clear or very pale yellow. It is often the most controllable risk factor for stone prevention. -Decrease dietary sodium intake to no more than 1 Tsp or 2300 mg daily (Target 24HU Na: <1.5 mmol/day; Ideal: <1 mmol/day). Calcium excretion into the urine is tied closely to the excretion of sodium.  -Maintain normal dietary calcium intake between 800-1200 mg of calcium per day.  Oxalate and calcium bind together to leave the body.  A low calcium diet will leave too much oxalate in the body.  -Maintain normal dietary oxalate intake. Foods high in oxalate include dark leafy greens, beets, nuts, tea, coffee, and chocolate. The most common types of kidney stones are made of calcium and oxalate. -Decrease animal protein. Purine is a compound found in many animal meats (beef, poultry, pork, fish) which breaks down into uric acid.  High uric acid levels may increase the risk of forming stones.  -Maintain adequate dietary fruit intake. Citrate binds to calcium and prevents calcium from binding to oxalate.  This protects you from making more kidney stones.       Plan:   -Follow up visit in 6 months with renal ultrasound for surveillance of left non-obstructing stone  -Continue with generalized stone prevention strategies as previously discussed (increase fluid intake to keep urine clear or very faint yellow, limit dietary salt intake, increase fruits and vegetables, limit high oxalate foods, maintain normal dietary calcium, and moderation of non-dairy animal protein)  -Emphasis on low oxalate diet, low-salt diet, and limiting his current high-protein diet -Repeat 24-hour urinalysis to evaluate efficacy of dietary changes

## 2024-02-23 NOTE — ADDENDUM
[FreeTextEntry1] : I, Dr. Alexis Melendez, personally performed the evaluation and management (E/M) services for this established patient with new problem(s)/exacerbation of existing condition(s). That E/M includes conducting the clinically appropriate interval history &/or physical exam, assessing all new/exacerbated conditions, and establishing a new care plan. My NP, Crista Velásquez, was here to observe my E/M services for this patient.

## 2024-02-23 NOTE — HISTORY OF PRESENT ILLNESS
[FreeTextEntry1] : Name OCTAVIO ALCANTARA MRN [id number]  Sep 3 1970 ------------------------------------------------------------------------------------------------------------------------------------------- Date of First Visit: 23 Referring Provider/PCP: Dr. Emmanuel Vicente / Dr. Josue Singh ------------------------------------------------------------------------------------------------------------------------------------------- CC: kidney stone  History of Present Illness: OCTAVIO ALCANTARA is a 54 y/o male PMH HTN, HLD, remote h/o angioplasty in his 30s who presents for evaluation of kidney stones. He was recently seen in the [facility] ED  for RLQ pain with radiation to right back/flank, noted to have 5 mm proximal right ureteral stone with mild hydronephrosis. On personal review of his imaging, there is also a nonobstructing 3 mm left lower pole stone. WBC 12.3, Cr 1.29, UA unremarkable with the exception of trace ketones.  Imaging: CT scan from 23 can be found in [facility] PACS. Findings: Obstructing right 5 mm proximal ureteral stone (). There is associated moderate right hydroureteronephrosis with marked perinephric and periureteral fat stranding. Subcentimeter left lower pole nonobstructing renal stone. No left-sided hydronephrosis. Bilateral parapelvic cysts. Bilateral subcentimeter hypodense lesions, too small to characterize.  Previous urine cultures: none on record  Kidney Stone History: First-time stone former - yes Concurrent asymptomatic stone(s) - yes Comorbidities - non-contributory Family history of kidney stones - no ------------------------------------------------------------------------------------------------------------------------------------------- Interval History (2024): OCTAVIO ALCANTARA presents s/p right ureteroscopy with laser lithotripsy and stent placement on 23. Patient presents today for stent removal and postoperative follow-up. He feels well. Denies fever, chills, nausea, vomiting. No stent-related symptoms. Had some GH the other day following excessive walking. Procedure: Stent was removed cystoscopically using sterile technique. Stent was intact. Patient tolerated the procedure well. -------------------------------------------------------------------------------------------------------------------------------------------  Interval History (2024): Patient presents for 1-month follow up and renal ultrasound after right ureteroscopy with laser lithotripsy and stent placement on 23.  Doing well, no complaints. Ultrasound performed in the office today demonstrates no evidence of hydronephrosis bilaterally. Redemonstration of known left 4.5mm left lower pole stone (noted to be 3mm on last CT). No stones in right kidney.     Stone composition: 100% Calcium oxalate monohydrate. Stone culture:     Results of 24-hour urine analysis (completed 2024) demonstrate:   -Low urine volume: 1.81 L/day  -Elevated urinary sodium: 235 mmol/day  -Hypercalciuria: 251 mg/day  -Hyperoxaluria: 50 mg/day  -Normal urinary citrate: 937 mg/day  -PCR: 1.5 mg/kg/day   -Hyperuricosuria: 1.308 g/day  -Urinary pH: 6.315

## 2024-06-18 DIAGNOSIS — I10 ESSENTIAL (PRIMARY) HYPERTENSION: ICD-10-CM

## 2024-06-18 RX ORDER — METOPROLOL TARTRATE 25 MG/1
25 TABLET, FILM COATED ORAL
Refills: 0 | Status: ACTIVE | COMMUNITY

## 2024-06-18 RX ORDER — ROSUVASTATIN CALCIUM 10 MG/1
10 TABLET, FILM COATED ORAL
Qty: 90 | Refills: 2 | Status: ACTIVE | COMMUNITY
Start: 1900-01-01 | End: 1900-01-01

## 2024-06-18 RX ORDER — METOPROLOL SUCCINATE 50 MG/1
50 TABLET, EXTENDED RELEASE ORAL
Qty: 90 | Refills: 3 | Status: ACTIVE | COMMUNITY
Start: 2023-09-10 | End: 1900-01-01

## 2024-06-18 RX ORDER — LOSARTAN POTASSIUM 25 MG/1
25 TABLET, FILM COATED ORAL
Qty: 90 | Refills: 3 | Status: ACTIVE | COMMUNITY
Start: 2024-06-18 | End: 1900-01-01

## 2024-08-19 ENCOUNTER — TRANSCRIPTION ENCOUNTER (OUTPATIENT)
Age: 54
End: 2024-08-19

## 2024-08-23 ENCOUNTER — APPOINTMENT (OUTPATIENT)
Dept: UROLOGY | Facility: CLINIC | Age: 54
End: 2024-08-23
Payer: COMMERCIAL

## 2024-08-23 VITALS
WEIGHT: 190 LBS | SYSTOLIC BLOOD PRESSURE: 142 MMHG | HEIGHT: 74 IN | TEMPERATURE: 98.1 F | DIASTOLIC BLOOD PRESSURE: 93 MMHG | BODY MASS INDEX: 24.38 KG/M2 | HEART RATE: 79 BPM | OXYGEN SATURATION: 98 %

## 2024-08-23 PROCEDURE — 76775 US EXAM ABDO BACK WALL LIM: CPT

## 2024-08-23 PROCEDURE — 99213 OFFICE O/P EST LOW 20 MIN: CPT

## 2024-08-23 NOTE — HISTORY OF PRESENT ILLNESS
[FreeTextEntry1] : Name OCTAVIO ALCANTARA MRN [id number]  Sep 3 1970 ------------------------------------------------------------------------------------------------------------------------------------------- Date of First Visit: 23 Referring Provider/PCP: Dr. Emmanuel Vicente / Dr. Josue Singh ------------------------------------------------------------------------------------------------------------------------------------------- CC: kidney stone  History of Present Illness: OCTAVIO ALCANTARA is a 54 y/o male PMH HTN, HLD, remote h/o angioplasty in his 30s who presents for evaluation of kidney stones. He was recently seen in the [facility] ED  for RLQ pain with radiation to right back/flank, noted to have 5 mm proximal right ureteral stone with mild hydronephrosis. On personal review of his imaging, there is also a nonobstructing 3 mm left lower pole stone. WBC 12.3, Cr 1.29, UA unremarkable with the exception of trace ketones.  Imaging: CT scan from 23 can be found in [facility] PACS. Findings: Obstructing right 5 mm proximal ureteral stone (). There is associated moderate right hydroureteronephrosis with marked perinephric and periureteral fat stranding. Subcentimeter left lower pole nonobstructing renal stone. No left-sided hydronephrosis. Bilateral parapelvic cysts. Bilateral subcentimeter hypodense lesions, too small to characterize.  Previous urine cultures: none on record  Kidney Stone History: First-time stone former - yes Concurrent asymptomatic stone(s) - yes Comorbidities - non-contributory Family history of kidney stones - no ------------------------------------------------------------------------------------------------------------------------------------------- Interval History (2024): OCTAVIO ALCANTARA presents s/p right ureteroscopy with laser lithotripsy and stent placement on 23. Patient presents today for stent removal and postoperative follow-up. He feels well. Denies fever, chills, nausea, vomiting. No stent-related symptoms. Had some GH the other day following excessive walking. Procedure: Stent was removed cystoscopically using sterile technique. Stent was intact. Patient tolerated the procedure well. ------------------------------------------------------------------------------------------------------------------------------------------- Interval History (2024): Patient presents for 1-month follow up and renal ultrasound after right ureteroscopy with laser lithotripsy and stent placement on 23. Doing well, no complaints. Ultrasound performed in the office today demonstrates no evidence of hydronephrosis bilaterally. Redemonstration of known left 4.5mm left lower pole stone (noted to be 3mm on last CT). No stones in right kidney.  Stone composition: 100% Calcium oxalate monohydrate. Stone culture: n/a  Results of 24-hour urine analysis (completed 2024) demonstrate: -Low urine volume: 1.81 L/day -Elevated urinary sodium: 235 mmol/day -Hypercalciuria: 251 mg/day -Hyperoxaluria: 50 mg/day -Normal urinary citrate: 937 mg/day -PCR: 1.5 mg/kg/day -Hyperuricosuria: 1.308 g/day -Urinary pH: 6.315 ---------------------------------------------------------------------------------------------------------------------------------------------------------------- Interval History (2024): Patient presents for follow-up kidney stone surveillance visit with renal ultrasound. Doing well, no complaints. No recent stone-related events. Ultrasound performed in the office today demonstrates no evidence of hydronephrosis bilaterally. Re-demonstration of known bilateral renal stones: LLP focus 5.5 mm (from 4.5) c/w known stone. Right side remains stone free. Doing better with diet to lower salt, minimize purines and oxalates.

## 2024-08-23 NOTE — HISTORY OF PRESENT ILLNESS
[FreeTextEntry1] : Name OCTAVIO ALCANTARA MRN [id number]  Sep 3 1970 ------------------------------------------------------------------------------------------------------------------------------------------- Date of First Visit: 23 Referring Provider/PCP: Dr. Emmanuel Vicente / Dr. Josue Singh ------------------------------------------------------------------------------------------------------------------------------------------- CC: kidney stone  History of Present Illness: OCTAVIO ALCANTARA is a 54 y/o male PMH HTN, HLD, remote h/o angioplasty in his 30s who presents for evaluation of kidney stones. He was recently seen in the [facility] ED  for RLQ pain with radiation to right back/flank, noted to have 5 mm proximal right ureteral stone with mild hydronephrosis. On personal review of his imaging, there is also a nonobstructing 3 mm left lower pole stone. WBC 12.3, Cr 1.29, UA unremarkable with the exception of trace ketones.  Imaging: CT scan from 23 can be found in [facility] PACS. Findings: Obstructing right 5 mm proximal ureteral stone (). There is associated moderate right hydroureteronephrosis with marked perinephric and periureteral fat stranding. Subcentimeter left lower pole nonobstructing renal stone. No left-sided hydronephrosis. Bilateral parapelvic cysts. Bilateral subcentimeter hypodense lesions, too small to characterize.  Previous urine cultures: none on record  Kidney Stone History: First-time stone former - yes Concurrent asymptomatic stone(s) - yes Comorbidities - non-contributory Family history of kidney stones - no ------------------------------------------------------------------------------------------------------------------------------------------- Interval History (2024): OCTAVIO ALCANTARA presents s/p right ureteroscopy with laser lithotripsy and stent placement on 23. Patient presents today for stent removal and postoperative follow-up. He feels well. Denies fever, chills, nausea, vomiting. No stent-related symptoms. Had some GH the other day following excessive walking. Procedure: Stent was removed cystoscopically using sterile technique. Stent was intact. Patient tolerated the procedure well. ------------------------------------------------------------------------------------------------------------------------------------------- Interval History (2024): Patient presents for 1-month follow up and renal ultrasound after right ureteroscopy with laser lithotripsy and stent placement on 23. Doing well, no complaints. Ultrasound performed in the office today demonstrates no evidence of hydronephrosis bilaterally. Redemonstration of known left 4.5mm left lower pole stone (noted to be 3mm on last CT). No stones in right kidney.  Stone composition: 100% Calcium oxalate monohydrate. Stone culture: n/a  Results of 24-hour urine analysis (completed 2024) demonstrate: -Low urine volume: 1.81 L/day -Elevated urinary sodium: 235 mmol/day -Hypercalciuria: 251 mg/day -Hyperoxaluria: 50 mg/day -Normal urinary citrate: 937 mg/day -PCR: 1.5 mg/kg/day -Hyperuricosuria: 1.308 g/day -Urinary pH: 6.315 ---------------------------------------------------------------------------------------------------------------------------------------------------------------- Interval History (2024): Patient presents for follow-up kidney stone surveillance visit with renal ultrasound. Doing well, no complaints. No recent stone-related events. Ultrasound performed in the office today demonstrates no evidence of hydronephrosis bilaterally. Re-demonstration of known bilateral renal stones: LLP focus 5.5 mm (from 4.5) c/w known stone. Right side remains stone free. Doing better with diet to lower salt, minimize purines and oxalates.

## 2024-08-23 NOTE — ASSESSMENT
[FreeTextEntry1] : Assessment: OCTAVIO ALCANTARA is a 54 y/o M with nephrolithiasis with prior 24-hour urinalysis is notable for hyperoxaluria, hyperuricosuria, and mild hypercalciuria in the setting of elevated 24-hour urinary sodium. Right side stone-free and stable left lower pole stone on US.   Plan:  -Follow up visit in 1 year with renal ultrasound -Continue with generalized stone prevention strategies as previously discussed (increase fluid intake to keep urine clear or very faint yellow, limit dietary salt intake, increase fruits and vegetables, limit high oxalate foods, maintain normal dietary calcium, and moderation of non-dairy animal protein) -Referral to PCP (Dr. Russel Juarez)

## 2025-01-21 ENCOUNTER — NON-APPOINTMENT (OUTPATIENT)
Age: 55
End: 2025-01-21

## 2025-02-27 ENCOUNTER — NON-APPOINTMENT (OUTPATIENT)
Age: 55
End: 2025-02-27

## 2025-02-27 ENCOUNTER — APPOINTMENT (OUTPATIENT)
Dept: HEART AND VASCULAR | Facility: CLINIC | Age: 55
End: 2025-02-27
Payer: COMMERCIAL

## 2025-02-27 VITALS — SYSTOLIC BLOOD PRESSURE: 130 MMHG | DIASTOLIC BLOOD PRESSURE: 80 MMHG

## 2025-02-27 VITALS
SYSTOLIC BLOOD PRESSURE: 150 MMHG | OXYGEN SATURATION: 98 % | HEART RATE: 84 BPM | WEIGHT: 196 LBS | HEIGHT: 74 IN | BODY MASS INDEX: 25.15 KG/M2 | DIASTOLIC BLOOD PRESSURE: 95 MMHG | TEMPERATURE: 97.5 F

## 2025-02-27 DIAGNOSIS — E78.5 HYPERLIPIDEMIA, UNSPECIFIED: ICD-10-CM

## 2025-02-27 DIAGNOSIS — I25.10 ATHEROSCLEROTIC HEART DISEASE OF NATIVE CORONARY ARTERY W/OUT ANGINA PECTORIS: ICD-10-CM

## 2025-02-27 DIAGNOSIS — I10 ESSENTIAL (PRIMARY) HYPERTENSION: ICD-10-CM

## 2025-02-27 PROCEDURE — 93000 ELECTROCARDIOGRAM COMPLETE: CPT

## 2025-02-27 PROCEDURE — 99203 OFFICE O/P NEW LOW 30 MIN: CPT

## 2025-02-27 RX ORDER — ASPIRIN 81 MG
81 TABLET,CHEWABLE ORAL
Refills: 0 | Status: ACTIVE | COMMUNITY

## 2025-07-01 ENCOUNTER — RX RENEWAL (OUTPATIENT)
Age: 55
End: 2025-07-01

## 2025-08-22 ENCOUNTER — APPOINTMENT (OUTPATIENT)
Dept: UROLOGY | Facility: CLINIC | Age: 55
End: 2025-08-22
Payer: COMMERCIAL

## 2025-08-22 PROCEDURE — G2211 COMPLEX E/M VISIT ADD ON: CPT | Mod: NC

## 2025-08-22 PROCEDURE — 76775 US EXAM ABDO BACK WALL LIM: CPT

## 2025-08-22 PROCEDURE — 99213 OFFICE O/P EST LOW 20 MIN: CPT

## 2025-09-17 ENCOUNTER — APPOINTMENT (OUTPATIENT)
Dept: HEART AND VASCULAR | Facility: CLINIC | Age: 55
End: 2025-09-17

## (undated) DEVICE — GOWN ROYAL SILK XL

## (undated) DEVICE — VENODYNE/SCD SLEEVE CALF MEDIUM

## (undated) DEVICE — GLV 7.5 PROTEXIS (WHITE)

## (undated) DEVICE — DRAPE C ARM 41X74"

## (undated) DEVICE — PACK CYSTO

## (undated) DEVICE — TUBING RANGER FLUID IRRIGATION SET DISP